# Patient Record
Sex: MALE | Race: BLACK OR AFRICAN AMERICAN | NOT HISPANIC OR LATINO | Employment: FULL TIME | URBAN - METROPOLITAN AREA
[De-identification: names, ages, dates, MRNs, and addresses within clinical notes are randomized per-mention and may not be internally consistent; named-entity substitution may affect disease eponyms.]

---

## 2022-01-24 ENCOUNTER — LAB VISIT (OUTPATIENT)
Dept: LAB | Facility: OTHER | Age: 37
End: 2022-01-24
Attending: INTERNAL MEDICINE
Payer: COMMERCIAL

## 2022-01-24 DIAGNOSIS — K85.90 ACUTE PANCREATITIS: Primary | ICD-10-CM

## 2022-01-24 DIAGNOSIS — K59.00 CONSTIPATED: ICD-10-CM

## 2022-01-24 LAB
ALBUMIN SERPL BCP-MCNC: 4.3 G/DL (ref 3.5–5.2)
ALP SERPL-CCNC: 87 U/L (ref 55–135)
ALT SERPL W/O P-5'-P-CCNC: 20 U/L (ref 10–44)
AMYLASE SERPL-CCNC: 138 U/L (ref 20–110)
ANION GAP SERPL CALC-SCNC: 8 MMOL/L (ref 8–16)
AST SERPL-CCNC: 14 U/L (ref 10–40)
BASOPHILS # BLD AUTO: 0.01 K/UL (ref 0–0.2)
BASOPHILS NFR BLD: 0.2 % (ref 0–1.9)
BILIRUB DIRECT SERPL-MCNC: 0.4 MG/DL (ref 0.1–0.3)
BILIRUB SERPL-MCNC: 1.2 MG/DL (ref 0.1–1)
BUN SERPL-MCNC: 10 MG/DL (ref 6–20)
CALCIUM SERPL-MCNC: 9.8 MG/DL (ref 8.7–10.5)
CHLORIDE SERPL-SCNC: 99 MMOL/L (ref 95–110)
CO2 SERPL-SCNC: 26 MMOL/L (ref 23–29)
CREAT SERPL-MCNC: 1.2 MG/DL (ref 0.5–1.4)
DIFFERENTIAL METHOD: ABNORMAL
EOSINOPHIL # BLD AUTO: 0.1 K/UL (ref 0–0.5)
EOSINOPHIL NFR BLD: 1.9 % (ref 0–8)
ERYTHROCYTE [DISTWIDTH] IN BLOOD BY AUTOMATED COUNT: 12.1 % (ref 11.5–14.5)
EST. GFR  (AFRICAN AMERICAN): >60 ML/MIN/1.73 M^2
EST. GFR  (NON AFRICAN AMERICAN): >60 ML/MIN/1.73 M^2
GLUCOSE SERPL-MCNC: 378 MG/DL (ref 70–110)
HCT VFR BLD AUTO: 47.3 % (ref 40–54)
HGB BLD-MCNC: 16.8 G/DL (ref 14–18)
IMM GRANULOCYTES # BLD AUTO: 0.01 K/UL (ref 0–0.04)
IMM GRANULOCYTES NFR BLD AUTO: 0.2 % (ref 0–0.5)
LIPASE SERPL-CCNC: 219 U/L (ref 4–60)
LYMPHOCYTES # BLD AUTO: 2.3 K/UL (ref 1–4.8)
LYMPHOCYTES NFR BLD: 42.9 % (ref 18–48)
MCH RBC QN AUTO: 28.9 PG (ref 27–31)
MCHC RBC AUTO-ENTMCNC: 35.5 G/DL (ref 32–36)
MCV RBC AUTO: 81 FL (ref 82–98)
MONOCYTES # BLD AUTO: 0.5 K/UL (ref 0.3–1)
MONOCYTES NFR BLD: 8.9 % (ref 4–15)
NEUTROPHILS # BLD AUTO: 2.4 K/UL (ref 1.8–7.7)
NEUTROPHILS NFR BLD: 45.9 % (ref 38–73)
NRBC BLD-RTO: 0 /100 WBC
PLATELET # BLD AUTO: 177 K/UL (ref 150–450)
PMV BLD AUTO: 11.1 FL (ref 9.2–12.9)
POTASSIUM SERPL-SCNC: 4.6 MMOL/L (ref 3.5–5.1)
PROT SERPL-MCNC: 7.3 G/DL (ref 6–8.4)
RBC # BLD AUTO: 5.82 M/UL (ref 4.6–6.2)
SODIUM SERPL-SCNC: 133 MMOL/L (ref 136–145)
WBC # BLD AUTO: 5.31 K/UL (ref 3.9–12.7)

## 2022-01-24 PROCEDURE — 85025 COMPLETE CBC W/AUTO DIFF WBC: CPT | Performed by: INTERNAL MEDICINE

## 2022-01-24 PROCEDURE — 83690 ASSAY OF LIPASE: CPT | Performed by: INTERNAL MEDICINE

## 2022-01-24 PROCEDURE — 36415 COLL VENOUS BLD VENIPUNCTURE: CPT | Performed by: INTERNAL MEDICINE

## 2022-01-24 PROCEDURE — 82150 ASSAY OF AMYLASE: CPT | Performed by: INTERNAL MEDICINE

## 2022-01-24 PROCEDURE — 80076 HEPATIC FUNCTION PANEL: CPT | Performed by: INTERNAL MEDICINE

## 2022-01-24 PROCEDURE — 80048 BASIC METABOLIC PNL TOTAL CA: CPT | Performed by: INTERNAL MEDICINE

## 2022-02-08 ENCOUNTER — TELEPHONE (OUTPATIENT)
Dept: GASTROENTEROLOGY | Facility: CLINIC | Age: 37
End: 2022-02-08
Payer: COMMERCIAL

## 2022-02-08 NOTE — TELEPHONE ENCOUNTER
----- Message from Isha Landis sent at 2/8/2022  8:25 AM CST -----  Regarding: MIssed call  Contact: [phone]  Calling in regards missed call to reschedule appointment. Please call

## 2022-02-08 NOTE — TELEPHONE ENCOUNTER
----- Message from Onel Sewell sent at 2/8/2022  8:12 AM CST -----  Contact: patient  Pt just received a missed call    Call back @[phone]

## 2022-02-08 NOTE — TELEPHONE ENCOUNTER
----- Message from Isha Landis sent at 2/8/2022  8:07 AM CST -----  Regarding: Rescheduling  Contact: [phone]  Calling in regards to missed call to reschedule appointment. Please call and rescheduling

## 2022-02-08 NOTE — TELEPHONE ENCOUNTER
Returned call, no answer, left message that his appointment with  for tomorrow has been cancelled.  Darlene, with the biliary department will reach out to him to schedule an appointment.   Marisel

## 2022-02-21 ENCOUNTER — LAB VISIT (OUTPATIENT)
Dept: LAB | Facility: OTHER | Age: 37
End: 2022-02-21
Attending: INTERNAL MEDICINE
Payer: COMMERCIAL

## 2022-02-21 DIAGNOSIS — K85.90 ACUTE PANCREATITIS: Primary | ICD-10-CM

## 2022-02-21 DIAGNOSIS — K59.00 CN (CONSTIPATION): ICD-10-CM

## 2022-02-21 DIAGNOSIS — R63.4 WEIGHT LOSS: ICD-10-CM

## 2022-02-21 DIAGNOSIS — R93.3 ABNORMAL VIRTUAL COLONOSCOPE: ICD-10-CM

## 2022-02-21 LAB
ALBUMIN SERPL BCP-MCNC: 4.2 G/DL (ref 3.5–5.2)
ALP SERPL-CCNC: 86 U/L (ref 55–135)
ALT SERPL W/O P-5'-P-CCNC: 24 U/L (ref 10–44)
AMYLASE SERPL-CCNC: 93 U/L (ref 20–110)
ANION GAP SERPL CALC-SCNC: 9 MMOL/L (ref 8–16)
AST SERPL-CCNC: 18 U/L (ref 10–40)
BILIRUB DIRECT SERPL-MCNC: 0.4 MG/DL (ref 0.1–0.3)
BILIRUB SERPL-MCNC: 0.8 MG/DL (ref 0.1–1)
BUN SERPL-MCNC: 12 MG/DL (ref 6–20)
CALCIUM SERPL-MCNC: 10.1 MG/DL (ref 8.7–10.5)
CHLORIDE SERPL-SCNC: 100 MMOL/L (ref 95–110)
CO2 SERPL-SCNC: 27 MMOL/L (ref 23–29)
CREAT SERPL-MCNC: 1.2 MG/DL (ref 0.5–1.4)
EST. GFR  (AFRICAN AMERICAN): >60 ML/MIN/1.73 M^2
EST. GFR  (NON AFRICAN AMERICAN): >60 ML/MIN/1.73 M^2
GLUCOSE SERPL-MCNC: 284 MG/DL (ref 70–110)
LIPASE SERPL-CCNC: 62 U/L (ref 4–60)
POTASSIUM SERPL-SCNC: 4 MMOL/L (ref 3.5–5.1)
PROT SERPL-MCNC: 7.6 G/DL (ref 6–8.4)
SODIUM SERPL-SCNC: 136 MMOL/L (ref 136–145)

## 2022-02-21 PROCEDURE — 82150 ASSAY OF AMYLASE: CPT | Performed by: INTERNAL MEDICINE

## 2022-02-21 PROCEDURE — 80076 HEPATIC FUNCTION PANEL: CPT | Performed by: INTERNAL MEDICINE

## 2022-02-21 PROCEDURE — 83690 ASSAY OF LIPASE: CPT | Performed by: INTERNAL MEDICINE

## 2022-02-21 PROCEDURE — 36415 COLL VENOUS BLD VENIPUNCTURE: CPT | Performed by: INTERNAL MEDICINE

## 2022-02-21 PROCEDURE — 80048 BASIC METABOLIC PNL TOTAL CA: CPT | Performed by: INTERNAL MEDICINE

## 2022-03-07 ENCOUNTER — HOSPITAL ENCOUNTER (OUTPATIENT)
Dept: RADIOLOGY | Facility: OTHER | Age: 37
Discharge: HOME | End: 2022-03-07
Attending: INTERNAL MEDICINE
Payer: COMMERCIAL

## 2022-03-07 DIAGNOSIS — R63.4 WEIGHT LOSS: ICD-10-CM

## 2022-03-07 DIAGNOSIS — R93.3 ABNORMAL UGI SERIES: ICD-10-CM

## 2022-03-07 DIAGNOSIS — K85.90 ACUTE PANCREATITIS: ICD-10-CM

## 2022-03-07 DIAGNOSIS — K59.00 CN (CONSTIPATION): ICD-10-CM

## 2022-03-07 PROCEDURE — 74183 MRI ABDOMEN WITH AND WO_INC MRCP: ICD-10-PCS | Mod: 26,,, | Performed by: RADIOLOGY

## 2022-03-07 PROCEDURE — 93976 VASCULAR STUDY: CPT | Mod: TC

## 2022-03-07 PROCEDURE — A9585 GADOBUTROL INJECTION: HCPCS | Performed by: INTERNAL MEDICINE

## 2022-03-07 PROCEDURE — 76376 MRI ABDOMEN WITH AND WO_INC MRCP: ICD-10-PCS | Mod: 26,,, | Performed by: RADIOLOGY

## 2022-03-07 PROCEDURE — 74183 MRI ABD W/O CNTR FLWD CNTR: CPT | Mod: TC

## 2022-03-07 PROCEDURE — 93976 VASCULAR STUDY: CPT | Mod: 26,,, | Performed by: RADIOLOGY

## 2022-03-07 PROCEDURE — 76775 US EXAM ABDO BACK WALL LIM: CPT | Mod: 26,XS,, | Performed by: RADIOLOGY

## 2022-03-07 PROCEDURE — 76376 3D RENDER W/INTRP POSTPROCES: CPT | Mod: 26,,, | Performed by: RADIOLOGY

## 2022-03-07 PROCEDURE — 74183 MRI ABD W/O CNTR FLWD CNTR: CPT | Mod: 26,,, | Performed by: RADIOLOGY

## 2022-03-07 PROCEDURE — 25500020 PHARM REV CODE 255: Performed by: INTERNAL MEDICINE

## 2022-03-07 PROCEDURE — 76775 US MESENTERIC ISCHEMIA STUDY (XPD): ICD-10-PCS | Mod: 26,XS,, | Performed by: RADIOLOGY

## 2022-03-07 PROCEDURE — 93976 US MESENTERIC ISCHEMIA STUDY (XPD): ICD-10-PCS | Mod: 26,,, | Performed by: RADIOLOGY

## 2022-03-07 RX ORDER — GADOBUTROL 604.72 MG/ML
8.5 INJECTION INTRAVENOUS
Status: COMPLETED | OUTPATIENT
Start: 2022-03-07 | End: 2022-03-07

## 2022-03-07 RX ADMIN — GADOBUTROL 8.5 ML: 604.72 INJECTION INTRAVENOUS at 08:03

## 2025-06-04 PROBLEM — K31.84 GASTROPARESIS: Status: ACTIVE | Noted: 2025-06-04

## 2025-06-04 PROBLEM — E55.9 VITAMIN D DEFICIENCY: Status: ACTIVE | Noted: 2024-08-12

## 2025-06-04 PROBLEM — Z91.199 NON COMPLIANCE WITH MEDICAL TREATMENT: Status: ACTIVE | Noted: 2024-08-12

## 2025-06-04 PROBLEM — R35.1 NOCTURIA: Status: ACTIVE | Noted: 2025-02-13

## 2025-06-04 PROBLEM — E11.65 UNCONTROLLED TYPE 2 DIABETES MELLITUS WITH HYPERGLYCEMIA: Status: ACTIVE | Noted: 2024-08-12

## 2025-06-04 PROBLEM — Z87.19 HISTORY OF ACUTE PANCREATITIS: Status: ACTIVE | Noted: 2024-08-12

## 2025-06-04 PROBLEM — E78.5 HYPERLIPIDEMIA LDL GOAL <100: Status: ACTIVE | Noted: 2024-08-12

## 2025-06-04 PROBLEM — I25.10 CORONARY ARTERY DISEASE INVOLVING NATIVE CORONARY ARTERY OF NATIVE HEART WITHOUT ANGINA PECTORIS: Status: ACTIVE | Noted: 2024-08-12

## 2025-06-04 PROBLEM — I10 ESSENTIAL HYPERTENSION: Chronic | Status: ACTIVE | Noted: 2025-02-13

## 2025-06-11 ENCOUNTER — TELEPHONE (OUTPATIENT)
Dept: ENDOSCOPY | Facility: HOSPITAL | Age: 40
End: 2025-06-11
Payer: COMMERCIAL

## 2025-06-11 ENCOUNTER — OFFICE VISIT (OUTPATIENT)
Dept: GASTROENTEROLOGY | Facility: CLINIC | Age: 40
End: 2025-06-11
Payer: COMMERCIAL

## 2025-06-11 VITALS
BODY MASS INDEX: 32.31 KG/M2 | HEART RATE: 94 BPM | WEIGHT: 213.19 LBS | DIASTOLIC BLOOD PRESSURE: 74 MMHG | HEIGHT: 68 IN | SYSTOLIC BLOOD PRESSURE: 106 MMHG

## 2025-06-11 VITALS — WEIGHT: 213 LBS | BODY MASS INDEX: 31.55 KG/M2 | HEIGHT: 69 IN

## 2025-06-11 DIAGNOSIS — K92.0 HEMATEMESIS, UNSPECIFIED WHETHER NAUSEA PRESENT: Primary | ICD-10-CM

## 2025-06-11 DIAGNOSIS — K31.84 GASTROPARESIS: ICD-10-CM

## 2025-06-11 DIAGNOSIS — R10.84 GENERALIZED ABDOMINAL PAIN: ICD-10-CM

## 2025-06-11 DIAGNOSIS — K59.00 CONSTIPATION, UNSPECIFIED CONSTIPATION TYPE: ICD-10-CM

## 2025-06-11 DIAGNOSIS — R10.9 ABDOMINAL PAIN, UNSPECIFIED ABDOMINAL LOCATION: Primary | ICD-10-CM

## 2025-06-11 DIAGNOSIS — E11.65 UNCONTROLLED TYPE 2 DIABETES MELLITUS WITH HYPERGLYCEMIA: ICD-10-CM

## 2025-06-11 DIAGNOSIS — R11.2 NAUSEA AND VOMITING, UNSPECIFIED VOMITING TYPE: ICD-10-CM

## 2025-06-11 DIAGNOSIS — K92.0 HEMATEMESIS, UNSPECIFIED WHETHER NAUSEA PRESENT: ICD-10-CM

## 2025-06-11 PROCEDURE — 3046F HEMOGLOBIN A1C LEVEL >9.0%: CPT | Mod: CPTII,S$GLB,,

## 2025-06-11 PROCEDURE — 3078F DIAST BP <80 MM HG: CPT | Mod: CPTII,S$GLB,,

## 2025-06-11 PROCEDURE — 99999 PR PBB SHADOW E&M-EST. PATIENT-LVL IV: CPT | Mod: PBBFAC,,,

## 2025-06-11 PROCEDURE — 99204 OFFICE O/P NEW MOD 45 MIN: CPT | Mod: S$GLB,,,

## 2025-06-11 PROCEDURE — 1159F MED LIST DOCD IN RCRD: CPT | Mod: CPTII,S$GLB,,

## 2025-06-11 PROCEDURE — 3074F SYST BP LT 130 MM HG: CPT | Mod: CPTII,S$GLB,,

## 2025-06-11 PROCEDURE — 1160F RVW MEDS BY RX/DR IN RCRD: CPT | Mod: CPTII,S$GLB,,

## 2025-06-11 PROCEDURE — 3008F BODY MASS INDEX DOCD: CPT | Mod: CPTII,S$GLB,,

## 2025-06-11 NOTE — PROGRESS NOTES
Gastroenterology Clinic Consultation Note    Patient ID: Jaime Pro Jr. is a 39 y.o. male.        Chief Complaint: Abnormal Abdominal/Liver Imaging (Present for a week, pt reports, vomiting, dizziness, back aches. )    CHIEF COMPLAINT:  Patient presents today for gastric issues with vomiting that started Tuesday of last week.    HISTORY OF PRESENT ILLNESS:  Referred for gastroparesis. He has experienced vomiting episodes since last Tuesday with severe pain in his abdomen, chest, and back, affecting his sleep. Hematemesis in the last 2-3 days. He notes dysphagia once or twice, which he attributes to chest muscle soreness from vomiting. His abdominal pain initially presents as diffuse discomfort in the epigastric area then throughout the entire abdomen, progressing in a specific pattern over the course of a week or longer. The pain begins in the upper abdomen, moves laterally from side to side, and eventually settles in the lower abdomen. He has a prior gastric emptying study performed at [facility]. He currently takes Reglan 10 mg twice daily with relief. He typically has normal bowel movements but is currently experiencing constipation with last bowel movement last night. This was his 1st bowel movement since this pain started.  He denies hematochezia. He denies a family history of GI cancers but it is notable for lupus and heart disease on paternal side and diabetes on maternal side. He has not had an EGD or colonoscopy before.    ROS:  Review of Systems   Constitutional:  Negative for chills and fever.   Respiratory:  Negative for cough and shortness of breath.    Cardiovascular:  Negative for chest pain.   Gastrointestinal:  Positive for abdominal pain, constipation, nausea and vomiting. Negative for blood in stool, diarrhea, heartburn and melena.   Skin:  Negative for rash.   Neurological:  Negative for seizures, loss of consciousness and weakness.        Medical History:  has a past medical  "history of Diabetes mellitus.    Surgical History:  has no past surgical history on file.    Family History: family history is not on file..       Review of patient's allergies indicates:   Allergen Reactions    Penicillins Hives and Rash       Medications Ordered Prior to Encounter[1]    Labs:  Lab Results   Component Value Date    WBC 6.36 06/05/2025    HGB 16.3 06/05/2025    HCT 47.3 06/05/2025     (L) 06/05/2025    CHOL 225 (H) 04/07/2025    TRIG 468 (H) 04/07/2025    HDL 42 04/07/2025    ALKPHOS 82 06/05/2025    LIPASE 21 06/05/2025    ALT 22 06/05/2025    AST 26 06/05/2025     06/05/2025    K 3.8 06/05/2025     06/05/2025    CREATININE 1.2 06/05/2025    BUN 15 06/05/2025    CO2 21 (L) 06/05/2025    TSH 2.91 08/12/2024    HGBA1C 14.0 (H) 02/13/2025       Vital Signs:  /74 (BP Location: Right arm, Patient Position: Sitting)   Pulse 94   Ht 5' 8" (1.727 m)   Wt 96.7 kg (213 lb 3 oz)   BMI 32.41 kg/m²   Body mass index is 32.41 kg/m².    Physical Exam:  Physical Exam  Vitals and nursing note reviewed.   Constitutional:       General: He is not in acute distress.     Appearance: Normal appearance. He is not ill-appearing.   HENT:      Head: Normocephalic and atraumatic.   Eyes:      Extraocular Movements: Extraocular movements intact.   Cardiovascular:      Rate and Rhythm: Normal rate and regular rhythm.   Pulmonary:      Effort: Pulmonary effort is normal. No respiratory distress.   Abdominal:      General: Abdomen is flat. Bowel sounds are normal. There is no distension.      Palpations: Abdomen is soft.      Tenderness: There is no abdominal tenderness.   Neurological:      General: No focal deficit present.      Mental Status: He is alert and oriented to person, place, and time.   Psychiatric:         Mood and Affect: Mood normal.         Behavior: Behavior normal.         Imaging reviewed: CT Abdomen Pelvis 06/05/2025  FINDINGS:  Images of the lower thorax are remarkable for " bilateral dependent atelectasis, left greater than right.  There is liquid content within the distal esophagus, may reflect sequela of gastroesophageal reflux.     The liver is hypoattenuating suggesting steatosis, correlation with LFTs recommended.  The spleen, pancreas, gallbladder and adrenal glands are grossly unremarkable.  The stomach is nondistended, no gastric wall thickening.  The portal vein, splenic vein, SMV, celiac axis and SMA all are patent.  There are a few scattered prominent jazmyne hepatic lymph nodes.     The kidneys enhance symmetrically without hydronephrosis or nephrolithiasis.  The bilateral ureters are unremarkable without calculi seen.  The urinary bladder is distended without wall thickening.  The prostate is not enlarged.     There are a few scattered colonic diverticula without inflammation to suggest diverticulitis.  The terminal ileum is unremarkable.  The appendix is unremarkable.  The small bowel is grossly unremarkable.  There are a few scattered shotty periaortic, pericaval, and mesenteric lymph nodes.  No focal organized pelvic fluid collection.  There is atherosclerotic calcification of the aorta and its branches.     No acute osseous abnormality.  There is right gynecomastia.  No significant inguinal lymphadenopathy.     Impression:     1. Findings suggest hepatic steatosis, correlation with LFTs recommended.  2. Colonic diverticulosis without diverticulitis.  3. Findings suggest gastroesophageal reflux.  4. Please see above for several additional findings.        Electronically signed by:Kirk Lawrence MD  Date:                                            06/05/2025  Time:                                           19:17    Endoscopy reviewed: No previous endoscopies    Assessment & Plan  1. Hematemesis, unspecified whether nausea present    2. Gastroparesis    3. Uncontrolled type 2 diabetes mellitus with hyperglycemia    4. Constipation, unspecified constipation type    5.  Generalized abdominal pain      HEMATEMESIS AND GASTROPARESIS:  - Assessed ongoing gastric issues, including recent episode of vomiting with blood.  - Considered gastroparesis as potential underlying condition.  - Determined need for upper endoscopy (EGD) due to:  - 1. Presence of blood in vomit  - 2. Persistent vomiting  - 3. Potential esophageal tear from excessive vomiting.  - Evaluated effectiveness of current Reglan treatment.  - Considered impact of uncontrolled glucose on symptoms.  - Discussed gastroparesis:  -  Causes food to remain in stomach for extended periods  -  Can lead to feeling of fullness and nausea.  - Explained potential side effects of Reglan:  -  Dizziness  -  Fatigue  -  Tardive dyskinesia (involuntary movements).  - Continue Reglan 10 mg twice daily.  - Ordered urgent upper endoscopy (EGD).    DYSPHAGIA:  - Explained upper endoscopy (EGD) procedure:  -  Patient will be asleep during the procedure  -  Examination of esophagus, stomach, and beginning of small intestine  -  Biopsies may be taken to rule out H. pylori, gastritis, lactose intolerance, and gluten sensitivities  -  Potential for esophageal dilation if narrowing is observed  -  Ability to address any bleeding if found.    CONSTIPATION:  - Assessed for potential constipation related to gastroparesis and medication side effects.  - Educated on constipation:  -  Relationship between slow gastric motility and constipation  -  Impact on bloating and abdominal pain  -  Zofran's potential to cause constipation with prolonged use.  - Patient to increase water intake.  - Started daily fiber supplement (options include Metamucil, Citrucel, or psyllium husks).  - Started MiraLAX, can take up to twice daily as needed for constipation.  - Consider using magnesium citrate for significant constipation to achieve complete bowel cleanse before starting daily regimen.    ABDOMINAL PAIN:  - Assessed ongoing gastric issues, including recent episode of  "vomiting with blood.    UNCONTROLLED TYPE 2 DIABETES MELLITUS WITH HYPERGLYCEMIA  -continue to follow up with Endocrinology  -continue to work on controlling blood glucose    FOLLOW-UP:  - Follow up with physician after procedure  - Contact office through patient portal if any questions or changes in condition before the procedure           BOBBI SIU, RANDELL-C  Gastroenterology Department  [facility] [address]  [phone]     This note was generated with the assistance of ambient listening technology. Verbal consent was obtained by the patient and accompanying visitor(s) for the recording of patient appointment to facilitate this note. I attest to having reviewed and edited the generated note for accuracy, though some syntax or spelling errors may persist. Please contact the author of this note for any clarification.          [1]   Current Outpatient Medications on File Prior to Visit   Medication Sig Dispense Refill    LANTUS SOLOSTAR U-100 INSULIN 100 unit/mL (3 mL) InPn pen Inject 25 Units into the skin once daily.      metoclopramide HCl (REGLAN) 10 MG tablet Take 10 mg by mouth 2 (two) times daily.      metoprolol succinate (TOPROL-XL) 50 MG 24 hr tablet Take 50 mg by mouth once daily.      ondansetron (ZOFRAN-ODT) 4 MG TbDL Take 1 tablet (4 mg total) by mouth every 6 (six) hours as needed (nausea). 30 tablet 0    pantoprazole (PROTONIX) 40 MG tablet Take 1 tablet (40 mg total) by mouth once daily. 90 tablet 0    pen needle, diabetic 32 gauge x 5/32" Ndle 1 each by Other route.      rosuvastatin (CRESTOR) 40 MG Tab Take 40 mg by mouth once daily.       No current facility-administered medications on file prior to visit.     "

## 2025-06-11 NOTE — TELEPHONE ENCOUNTER
"----- Message from Maryann sent at 6/11/2025 10:01 AM CDT -----  Regarding: FW: EGD    ----- Message -----  From: Romulo Sewell FNP-C  Sent: 6/11/2025   9:19 AM CDT  To: [facility]  Subject: EGD                                              Procedure: EGDDiagnosis: Abdominal pain, Hematemesis, and Nausea/VomitingProcedure Timing: Within 4 weeks (Urgent)#If within 4 weeks selected, please missy as high priority##If greater than 12 weeks, please select "5-12 weeks" and delay sending until 3 months prior to requested date# Location: Any SiteAdditional Scheduling Information: DiabetesPrep Specifications:Gastroparesis (Extended clear liquid)Is the patient taking a GLP-1 Agonist:noHave you attached a patient to this message: yes  "

## 2025-06-11 NOTE — TELEPHONE ENCOUNTER
Patient is scheduled for a Upper Endoscopy (EGD) on 6/25/25 with Dr. MONSE Walsh  Referral for procedure from [facility]

## 2025-06-25 ENCOUNTER — ANESTHESIA (OUTPATIENT)
Dept: ENDOSCOPY | Facility: HOSPITAL | Age: 40
End: 2025-06-25
Payer: COMMERCIAL

## 2025-06-25 ENCOUNTER — HOSPITAL ENCOUNTER (OUTPATIENT)
Facility: HOSPITAL | Age: 40
Discharge: HOME | End: 2025-06-25
Attending: INTERNAL MEDICINE | Admitting: INTERNAL MEDICINE
Payer: COMMERCIAL

## 2025-06-25 ENCOUNTER — ANESTHESIA EVENT (OUTPATIENT)
Dept: ENDOSCOPY | Facility: HOSPITAL | Age: 40
End: 2025-06-25
Payer: COMMERCIAL

## 2025-06-25 VITALS
TEMPERATURE: 98 F | HEART RATE: 69 BPM | RESPIRATION RATE: 18 BRPM | DIASTOLIC BLOOD PRESSURE: 84 MMHG | SYSTOLIC BLOOD PRESSURE: 116 MMHG | WEIGHT: 212.94 LBS | HEIGHT: 69 IN | OXYGEN SATURATION: 100 % | BODY MASS INDEX: 31.54 KG/M2

## 2025-06-25 DIAGNOSIS — K92.0 HEMATEMESIS, UNSPECIFIED WHETHER NAUSEA PRESENT: ICD-10-CM

## 2025-06-25 DIAGNOSIS — K31.84 GASTROPARESIS: Primary | ICD-10-CM

## 2025-06-25 DIAGNOSIS — K92.0 HEMATEMESIS: ICD-10-CM

## 2025-06-25 DIAGNOSIS — R10.9 ABDOMINAL PAIN, UNSPECIFIED ABDOMINAL LOCATION: ICD-10-CM

## 2025-06-25 DIAGNOSIS — R11.2 NAUSEA AND VOMITING, UNSPECIFIED VOMITING TYPE: ICD-10-CM

## 2025-06-25 LAB — POCT GLUCOSE: 296 MG/DL (ref 70–110)

## 2025-06-25 PROCEDURE — 25000003 PHARM REV CODE 250: Performed by: NURSE ANESTHETIST, CERTIFIED REGISTERED

## 2025-06-25 PROCEDURE — 43239 EGD BIOPSY SINGLE/MULTIPLE: CPT | Mod: ,,, | Performed by: INTERNAL MEDICINE

## 2025-06-25 PROCEDURE — 63600175 PHARM REV CODE 636 W HCPCS: Performed by: NURSE ANESTHETIST, CERTIFIED REGISTERED

## 2025-06-25 PROCEDURE — 88305 TISSUE EXAM BY PATHOLOGIST: CPT | Mod: TC | Performed by: INTERNAL MEDICINE

## 2025-06-25 PROCEDURE — 43239 EGD BIOPSY SINGLE/MULTIPLE: CPT | Performed by: INTERNAL MEDICINE

## 2025-06-25 PROCEDURE — 37000008 HC ANESTHESIA 1ST 15 MINUTES: Performed by: INTERNAL MEDICINE

## 2025-06-25 PROCEDURE — 82962 GLUCOSE BLOOD TEST: CPT | Performed by: INTERNAL MEDICINE

## 2025-06-25 PROCEDURE — 37000009 HC ANESTHESIA EA ADD 15 MINS: Performed by: INTERNAL MEDICINE

## 2025-06-25 PROCEDURE — 27201012 HC FORCEPS, HOT/COLD, DISP: Performed by: INTERNAL MEDICINE

## 2025-06-25 PROCEDURE — 88305 TISSUE EXAM BY PATHOLOGIST: CPT | Mod: 26,,, | Performed by: PATHOLOGY

## 2025-06-25 RX ORDER — ASPIRIN 81 MG/1
81 TABLET ORAL DAILY
COMMUNITY

## 2025-06-25 RX ORDER — SODIUM CHLORIDE 9 MG/ML
INJECTION, SOLUTION INTRAVENOUS CONTINUOUS
Status: DISCONTINUED | OUTPATIENT
Start: 2025-06-25 | End: 2025-06-25 | Stop reason: HOSPADM

## 2025-06-25 RX ORDER — PROPOFOL 10 MG/ML
VIAL (ML) INTRAVENOUS
Status: DISCONTINUED | OUTPATIENT
Start: 2025-06-25 | End: 2025-06-25

## 2025-06-25 RX ORDER — LIDOCAINE HYDROCHLORIDE 20 MG/ML
INJECTION INTRAVENOUS
Status: DISCONTINUED | OUTPATIENT
Start: 2025-06-25 | End: 2025-06-25

## 2025-06-25 RX ORDER — PROPOFOL 10 MG/ML
VIAL (ML) INTRAVENOUS CONTINUOUS PRN
Status: DISCONTINUED | OUTPATIENT
Start: 2025-06-25 | End: 2025-06-25

## 2025-06-25 RX ORDER — SODIUM CHLORIDE 9 MG/ML
INJECTION, SOLUTION INTRAVENOUS CONTINUOUS PRN
Status: DISCONTINUED | OUTPATIENT
Start: 2025-06-25 | End: 2025-06-25

## 2025-06-25 RX ADMIN — PROPOFOL 100 MG: 10 INJECTION, EMULSION INTRAVENOUS at 12:06

## 2025-06-25 RX ADMIN — LIDOCAINE HYDROCHLORIDE 180 MG: 20 INJECTION INTRAVENOUS at 12:06

## 2025-06-25 RX ADMIN — SODIUM CHLORIDE: 0.9 INJECTION, SOLUTION INTRAVENOUS at 12:06

## 2025-06-25 RX ADMIN — PROPOFOL 200 MCG/KG/MIN: 10 INJECTION, EMULSION INTRAVENOUS at 12:06

## 2025-06-25 NOTE — PROVATION PATIENT INSTRUCTIONS
Discharge Summary/Instructions after an Endoscopic Procedure  Patient Name: Jaime Pro  Patient MRN: [id number]  Patient YOB: 1985 Wednesday, June 25, 2025  Shiela Walsh MD  Dear patient,  As a result of recent federal legislation (The Federal Cures Act), you may   receive lab or pathology results from your procedure in your [facility]   account before your physician is able to contact you. Your physician or   their representative will relay the results to you with their   recommendations at their soonest availability.  Thank you,  RESTRICTIONS:  During your procedure today, you received medications for sedation.  These   medications may affect your judgment, balance and coordination.  Therefore,   for 24 hours, you have the following restrictions:   - DO NOT drive a car, operate machinery, make legal/financial decisions,   sign important papers or drink alcohol.    ACTIVITY:  Today: no heavy lifting, straining or running due to procedural   sedation/anesthesia.  The following day: return to full activity including work.  DIET:  Eat and drink normally unless instructed otherwise.     TREATMENT FOR COMMON SIDE EFFECTS:  - Mild abdominal pain, nausea, belching, bloating or excessive gas:  rest,   eat lightly and use a heating pad.  - Sore Throat: treat with throat lozenges and/or gargle with warm salt   water.  - Because air was used during the procedure, expelling large amounts of air   from your rectum or belching is normal.  - If a bowel prep was taken, you may not have a bowel movement for 1-3 days.    This is normal.  SYMPTOMS TO WATCH FOR AND REPORT TO YOUR PHYSICIAN:  1. Abdominal pain or bloating, other than gas cramps.  2. Chest pain.  3. Back pain.  4. Signs of infection such as: chills or fever occurring within 24 hours   after the procedure.  5. Rectal bleeding, which would show as bright red, maroon, or black stools.   (A tablespoon of blood from the rectum is not serious, especially  if   hemorrhoids are present.)  6. Vomiting.  7. Weakness or dizziness.  GO DIRECTLY TO THE NEAREST EMERGENCY ROOM IF YOU HAVE ANY OF THE FOLLOWING:      Difficulty breathing              Chills and/or fever over 101 F   Persistent vomiting and/or vomiting blood   Severe abdominal pain   Severe chest pain   Black, tarry stools   Bleeding- more than one tablespoon   Any other symptom or condition that you feel may need urgent attention  Your doctor recommends these additional instructions:  If any biopsies were taken, your doctors clinic will contact you in 1 to 2   weeks with any results.  - Discharge patient to home.   - Resume previous diet.   - Continue present medications.   - Await pathology results.  For questions, problems or results please call your physician - Shiela Walsh MD at Work:  [phone].  [facility], EMERGENCY ROOM PHONE NUMBER: [phone]  IF A COMPLICATION OR EMERGENCY SITUATION ARISES AND YOU ARE UNABLE TO REACH   YOUR PHYSICIAN - GO DIRECTLY TO THE EMERGENCY ROOM.  Shiela Walsh MD  6/25/2025 12:39:36 PM  This report has been verified and signed electronically.  Dear patient,  As a result of recent federal legislation (The Federal Cures Act), you may   receive lab or pathology results from your procedure in your [facility]   account before your physician is able to contact you. Your physician or   their representative will relay the results to you with their   recommendations at their soonest availability.  Thank you,  PROVATION

## 2025-06-25 NOTE — H&P
Short Stay Endoscopy History and Physical    PCP - Bharati Skinner MD     Procedure - EGD  ASA - per anesthesia  Mallampati - per anesthesia  History of Anesthesia problems - no  Family history Anesthesia problems -  no   Plan of anesthesia - General    HPI:  This is a 39 y.o. male here for evaluation of : hematemesis, gastroparesis      Reflux - no  Dysphagia - no  Abdominal pain - no  Diarrhea - no    ROS:  Constitutional: No fevers, chills, No weight loss  CV: No chest pain  Pulm: No cough, No shortness of breath  Ophtho: No vision changes  GI: see HPI  Derm: No rash    Medical History:  has a past medical history of Diabetes mellitus.    Surgical History:  has no past surgical history on file.    Family History: family history is not on file.. Otherwise no colon cancer, inflammatory bowel disease, or GI malignancies.    Social History:  reports that he has never smoked. He has never used smokeless tobacco. He reports current alcohol use. He reports that he does not use drugs.    Review of patient's allergies indicates:   Allergen Reactions    Penicillins Hives and Rash       Medications:   Prescriptions Prior to Admission[1]    Physical Exam:    Vital Signs: There were no vitals filed for this visit.    Gen: NAD, lying comfortably  HENT: NCAT, oropharynx clear  Eyes: anicteric sclerae, EOMI grossly  Neck: supple, no visible masses/goiter  Cardiac: RRR  Lungs: non-labored breathing  Abd: soft, NT/ND, normoactive BS  Ext: no LE edema, warm, well perfused  Skin: skin intact on exposed body parts, no visible rashes, lesions  Neuro: A&Ox4, neuro exam grossly intact, moves all extremities  Psych: appropriate mood, affect      Labs:  Lab Results   Component Value Date    WBC 6.36 06/05/2025    HGB 16.3 06/05/2025    HCT 47.3 06/05/2025     (L) 06/05/2025    CHOL 225 (H) 04/07/2025    TRIG 468 (H) 04/07/2025    HDL 42 04/07/2025    ALT 22 06/05/2025    AST 26 06/05/2025     06/05/2025    K 3.8  "06/05/2025     06/05/2025    CREATININE 1.2 06/05/2025    BUN 15 06/05/2025    CO2 21 (L) 06/05/2025    TSH 2.91 08/12/2024    HGBA1C 14.0 (H) 02/13/2025       Plan:  EGD for hematemesis and gastroparesis    I have explained the risks and benefits of endoscopy procedures to the patient including but not limited to bleeding, perforation, infection, and death.  The patient was asked if they understand and allowed to ask any further questions to their satisfaction.      Shiela Walsh MD         [1]   Medications Prior to Admission   Medication Sig Dispense Refill Last Dose/Taking    LANTUS SOLOSTAR U-100 INSULIN 100 unit/mL (3 mL) InPn pen Inject 25 Units into the skin once daily.       metoclopramide HCl (REGLAN) 10 MG tablet Take 10 mg by mouth 2 (two) times daily.       metoprolol succinate (TOPROL-XL) 50 MG 24 hr tablet Take 50 mg by mouth once daily.       ondansetron (ZOFRAN-ODT) 4 MG TbDL Take 1 tablet (4 mg total) by mouth every 6 (six) hours as needed (nausea). 30 tablet 0     pantoprazole (PROTONIX) 40 MG tablet Take 1 tablet (40 mg total) by mouth once daily. 90 tablet 0     pen needle, diabetic 32 gauge x 5/32" Ndle 1 each by Other route.       rosuvastatin (CRESTOR) 40 MG Tab Take 40 mg by mouth once daily.        "

## 2025-06-25 NOTE — ANESTHESIA PREPROCEDURE EVALUATION
"[facility]  Anesthesia Pre-Operative Evaluation       Patient Name: Jaime Pro Jr.  YOB: 1985  MRN: [id number]  [facility]: [id number]      Code Status: Prior   Date of Procedure: 6/25/2025  Procedure: Procedure(s) (LRB):  EGD (ESOPHAGOGASTRODUODENOSCOPY) (N/A)  Anesthesia: Choice  Pre-Operative Diagnosis: Final diagnoses:  None  Proceduralist/Surgeons and Role:     * Shiela Walsh MD - Primary    SUBJECTIVE:   Jaime Pro Jr. is a 39 y.o. male w/ a significant PMHx listed below.    Patient now presents for the above procedure(s). Pt appropriately NPO.     LDA:       Anesthesia Evaluation      Airway   Mallampati: II  TM distance: Normal  Neck ROM: Normal ROM  Dental    (+) Intact    Pulmonary    Cardiovascular   Exercise tolerance: good  (+) hypertension well controlled, past MI, CAD    ECG reviewed  Rate: Normal    Neuro/Psych      GI/Hepatic/Renal    (+) bowel prep    Endo/Other    Abdominal                     ALLERGIES:     Review of patient's allergies indicates:   Allergen Reactions    Penicillins Hives and Rash     MEDICATIONS:     Current Outpatient Medications on File Prior to Encounter   Medication Sig Dispense Refill Last Dose/Taking    LANTUS SOLOSTAR U-100 INSULIN 100 unit/mL (3 mL) InPn pen Inject 25 Units into the skin once daily.       metoclopramide HCl (REGLAN) 10 MG tablet Take 10 mg by mouth 2 (two) times daily.       metoprolol succinate (TOPROL-XL) 50 MG 24 hr tablet Take 50 mg by mouth once daily.       ondansetron (ZOFRAN-ODT) 4 MG TbDL Take 1 tablet (4 mg total) by mouth every 6 (six) hours as needed (nausea). 30 tablet 0     pantoprazole (PROTONIX) 40 MG tablet Take 1 tablet (40 mg total) by mouth once daily. 90 tablet 0     pen needle, diabetic 32 gauge x 5/32" Ndle 1 each by Other route.       rosuvastatin (CRESTOR) 40 MG Tab Take 40 mg by mouth once daily.        Current Medications[1]       History:   There are no hospital problems to display for " "this patient.    Problem List[2]   Past Medical History:   Diagnosis Date    Diabetes mellitus      History reviewed. No pertinent surgical history.  Alcohol use:    Social History     Substance and Sexual Activity   Alcohol Use Yes          OBJECTIVE:   Last 3 sets of Vitals        6/5/2025     5:02 PM 6/11/2025     8:58 AM 6/11/2025     2:08 PM   Vitals - 1 value per visit   SYSTOLIC  106    DIASTOLIC  74    Pulse  94    Weight (lb) 220 213.19 213   Weight (kg) 99.791 96.7 96.616   Height  5' 8" (1.727 m) 5' 9" (1.753 m)   BMI (Calculated) 33.5 32.4 31.4       Vital Signs (Most Recent):    Vital Signs Range (Last 24H):        No intake or output data in the 24 hours ending 06/25/25 1110    There is no height or weight on file to calculate BMI.     Significant Labs:  Lab Results   Component Value Date    WBC 6.36 06/05/2025    HGB 16.3 06/05/2025    HCT 47.3 06/05/2025     (L) 06/05/2025    CHOL 225 (H) 04/07/2025    TRIG 468 (H) 04/07/2025    HDL 42 04/07/2025    ALT 22 06/05/2025    AST 26 06/05/2025     06/05/2025    K 3.8 06/05/2025     06/05/2025    CREATININE 1.2 06/05/2025    BUN 15 06/05/2025    CO2 21 (L) 06/05/2025    TSH 2.91 08/12/2024    HGBA1C 14.0 (H) 02/13/2025     No results found for: "PREGTESTUR", "PREGSERUM", "HCG", "HCGQUANT"   No LMP for male patient.    EKG:   No results found for this or any previous visit.    TTE:  No results found for this or any previous visit.  No results found for: "EF"  No results found for this or any previous visit.  ISABELLA:  No results found for this or any previous visit.  Stress Test:  No results found for this or any previous visit.     LHC:  No results found for this or any previous visit.     PFT:  No results found for: "FEV1", "FVC", "NSJ8MXP", "TLC", "DLCO"     ASSESSMENT/PLAN:                                                                                                                06/25/2025  Jaime Pro Jr. is a 39 y.o., " male.      Pre-op Assessment    I have reviewed the Patient Summary Reports.     I have reviewed the Nursing Notes. I have reviewed the NPO Status.   I have reviewed the Medications.     Review of Systems  Anesthesia Hx:  No problems with previous Anesthesia                Social:  Non-Smoker, Social Alcohol Use       Hematology/Oncology:  Hematology Normal   Oncology Normal                                   EENT/Dental:  EENT/Dental Normal           Cardiovascular:  Exercise tolerance: good   Hypertension, well controlled  Past MI CAD  asymptomatic            ECG has been reviewed. 2 stents in 2019 Patient on beta blockers Beta blocker taken in last 24 hours                         Pulmonary:  Pulmonary Normal                       Renal/:  Renal/ Normal                 Hepatic/GI:  Hepatic/GI Normal Bowel Prep.                   Musculoskeletal:  Musculoskeletal Normal                Neurological:  Neurology Normal                                      Endocrine:  Endocrine Normal            Dermatological:  Skin Normal    Psych:  Psychiatric Normal                    Physical Exam  General: Well nourished, Cooperative, Alert and Oriented    Airway:  Mallampati: II   Mouth Opening: Normal  TM Distance: Normal  Tongue: Normal  Neck ROM: Normal ROM    Dental:  Intact    Chest/Lungs:  Clear to auscultation, Normal Respiratory Rate    Heart:  Rate: Normal  Rhythm: Regular Rhythm  Sounds: Normal        Anesthesia Plan  Type of Anesthesia, risks & benefits discussed:    Anesthesia Type: Gen Natural Airway  Intra-op Monitoring Plan: Standard ASA Monitors  Induction:  IV  Informed Consent: Informed consent signed with the Patient and all parties understand the risks and agree with anesthesia plan.  All questions answered.   ASA Score: 2  Day of Surgery Review of History & Physical: H&P Update referred to the surgeon/provider.    Ready For Surgery From Anesthesia Perspective.     .           [1]   No current  "facility-administered medications for this encounter.     Current Outpatient Medications   Medication Sig Dispense Refill    LANTUS SOLOSTAR U-100 INSULIN 100 unit/mL (3 mL) InPn pen Inject 25 Units into the skin once daily.      metoclopramide HCl (REGLAN) 10 MG tablet Take 10 mg by mouth 2 (two) times daily.      metoprolol succinate (TOPROL-XL) 50 MG 24 hr tablet Take 50 mg by mouth once daily.      ondansetron (ZOFRAN-ODT) 4 MG TbDL Take 1 tablet (4 mg total) by mouth every 6 (six) hours as needed (nausea). 30 tablet 0    pantoprazole (PROTONIX) 40 MG tablet Take 1 tablet (40 mg total) by mouth once daily. 90 tablet 0    pen needle, diabetic 32 gauge x 5/32" Ndle 1 each by Other route.      rosuvastatin (CRESTOR) 40 MG Tab Take 40 mg by mouth once daily.     [2]   Patient Active Problem List  Diagnosis    Gastroparesis    Coronary artery disease involving native coronary artery of native heart without angina pectoris    Essential hypertension    History of acute pancreatitis    Hyperlipidemia LDL goal <100    Non compliance with medical treatment    Uncontrolled type 2 diabetes mellitus with hyperglycemia    Vitamin D deficiency    Nocturia     "

## 2025-06-25 NOTE — TRANSFER OF CARE
"Anesthesia Transfer of Care Note    Patient: Jaime Pro Jr.    Procedure(s) Performed: Procedure(s) (LRB):  EGD (ESOPHAGOGASTRODUODENOSCOPY) (N/A)    Patient location: PACU    Anesthesia Type: general    Transport from OR: Transported from OR on 6-10 L/min O2 by face mask with adequate spontaneous ventilation    Post pain: adequate analgesia    Post assessment: no apparent anesthetic complications and tolerated procedure well    Post vital signs: stable    Level of consciousness: sedated    Nausea/Vomiting: no nausea/vomiting    Complications: none    Transfer of care protocol was followed      Last vitals: Visit Vitals  /86 (BP Location: Left arm, Patient Position: Lying)   Pulse 83   Temp 36.4 °C (97.5 °F)   Resp 13   Ht 5' 9" (1.753 m)   Wt 96.6 kg (212 lb 15.4 oz)   SpO2 100%   BMI 31.45 kg/m²     "

## 2025-06-27 LAB
ESTROGEN SERPL-MCNC: NORMAL PG/ML
INSULIN SERPL-ACNC: NORMAL U[IU]/ML
LAB AP CLINICAL INFORMATION: NORMAL
LAB AP GROSS DESCRIPTION: NORMAL
LAB AP PERFORMING LOCATION(S): NORMAL
LAB AP REPORT FOOTNOTES: NORMAL

## 2025-06-28 ENCOUNTER — NURSE TRIAGE (OUTPATIENT)
Dept: ADMINISTRATIVE | Facility: CLINIC | Age: 40
End: 2025-06-28
Payer: COMMERCIAL

## 2025-06-28 NOTE — TELEPHONE ENCOUNTER
Pt states that he had a Endoscopy on 6/25 and today he states that his teeth are loose. He ended up swallowing his gold tooth. Pt was eating noodles and stated that it went down easily. Denies any problems swallowing. Denies any difficulty breathing. Pt states that he does not know if it has come out in his BM, but pt states that he has been going to the bathroom a lot. Denies any vomiting. Denies any abdominal pain. Dispo- See PCP within 24 hours. Pt does not have a PCP with [facility], advised pt to be seen in an C, Pt is also asking who he can call to see about getting his Gold tooth taken care of, since it was not loose before the Endoscopy procedure. Advised pt to call the [facility]oscopy Clinic where he had the procedure done, on Monday morning and speak with someone there. Pt aware and VU. Advised pt to call back with any further concerns or questions.             Reason for Disposition   [1] Smooth object > 1 inch (2.5 cm) across (e.g., quarter is 2.5 cm) AND [2] no symptoms    Additional Information   Negative: Difficulty breathing (e.g., coughing, stridor, wheezing)   Negative: Sounds like a life-threatening emergency to the triager   Negative: Symptoms of blocked esophagus (e.g., can't swallow normal secretions, can't swallow water, drooling)   Negative: Symptoms of FB stuck in throat or esophagus (e.g., continued pain in throat or chest, FB sensation, blood-tinged saliva)  (Exception: Pill or hard candy stuck.)   Negative: Swallowed sharp object (e.g., needle, nail, safety pin, toothpick, bone, bottle cap, pull tab, dental bridge work)  (Exceptions: Dental crowns and tiny chips of glass generally pass without any symptoms.)   Negative: Swallowed button battery (or other battery; known or suspected)   Negative: Swallowed magnet   Negative: Swallowed packet of drugs (e.g., condom filled with cocaine)   Negative: Swallowed 2 or more FBs (e.g., multiple objects)   Negative: [1] Swallowed a pill or hard candy AND  [2] SEVERE symptoms of pill stuck in throat or esophagus (e.g., severe pain, bleeding, or can't swallow liquids)   Negative: Blood in the stools   Negative: [1] Vomited 2 or more times AND [2] FB hasn't passed   Negative: Swallowed a (non-food) FB on purpose   Negative: [1] Abdominal pain AND [2] FB hasn't passed   Negative: Patient sounds very sick or weak to the triager   Negative: Patient is confused or is an unreliable provider of information (e.g., dementia, severe intellectual disability, alcohol intoxication)   Negative: [1] Swallowed a pill or hard candy AND [2] mild symptoms of pill stuck in throat or esophagus (e.g., mild pain in throat or chest, FB sensation) AND [3] no relief after using Care Advice    Protocols used: Swallowed Foreign Body-A-AH

## 2025-06-30 NOTE — TELEPHONE ENCOUNTER
MA called/spoke to patient. Let him know per Dr Walsh that she is out of the office today and that she is going to send a message to the manager in Endo and have them reach out to him. Patient verbalized understanding and had no questions/concerns.

## 2025-07-02 ENCOUNTER — RESULTS FOLLOW-UP (OUTPATIENT)
Dept: GASTROENTEROLOGY | Facility: CLINIC | Age: 40
End: 2025-07-02